# Patient Record
Sex: MALE | Race: WHITE | Employment: FULL TIME | URBAN - METROPOLITAN AREA
[De-identification: names, ages, dates, MRNs, and addresses within clinical notes are randomized per-mention and may not be internally consistent; named-entity substitution may affect disease eponyms.]

---

## 2018-08-22 PROBLEM — E78.2 MIXED HYPERLIPIDEMIA: Chronic | Status: ACTIVE | Noted: 2018-08-22

## 2018-08-25 ENCOUNTER — HOSPITAL ENCOUNTER (EMERGENCY)
Age: 28
Discharge: HOME | End: 2018-08-25
Attending: EMERGENCY MEDICINE
Payer: OTHER GOVERNMENT

## 2018-08-25 VITALS
TEMPERATURE: 97.6 F | HEART RATE: 59 BPM | OXYGEN SATURATION: 100 % | SYSTOLIC BLOOD PRESSURE: 134 MMHG | DIASTOLIC BLOOD PRESSURE: 85 MMHG | RESPIRATION RATE: 16 BRPM

## 2018-08-25 DIAGNOSIS — T15.02XA FOREIGN BODY OF LEFT CORNEA, INITIAL ENCOUNTER: Primary | ICD-10-CM

## 2018-08-25 PROCEDURE — 75810000285 HC RMVL FB EYE W/ OR W/O SLIT LAMP: Performed by: EMERGENCY MEDICINE

## 2018-08-25 PROCEDURE — 74011250637 HC RX REV CODE- 250/637: Performed by: EMERGENCY MEDICINE

## 2018-08-25 PROCEDURE — 99284 EMERGENCY DEPT VISIT MOD MDM: CPT | Performed by: EMERGENCY MEDICINE

## 2018-08-25 RX ORDER — ERYTHROMYCIN 5 MG/G
OINTMENT OPHTHALMIC
Status: COMPLETED | OUTPATIENT
Start: 2018-08-25 | End: 2018-08-25

## 2018-08-25 RX ORDER — ERYTHROMYCIN 5 MG/G
OINTMENT OPHTHALMIC
Qty: 1 TUBE | Refills: 0 | Status: SHIPPED | OUTPATIENT
Start: 2018-08-25 | End: 2018-10-09

## 2018-08-25 RX ADMIN — ERYTHROMYCIN: 5 OINTMENT OPHTHALMIC at 11:21

## 2018-08-25 NOTE — ED NOTES
I have reviewed discharge instructions with the patient. The patient verbalized understanding. Patient left ED via Discharge Method: ambulatory to Home with self  Opportunity for questions and clarification provided. Patient given 1 scripts. To continue your aftercare when you leave the hospital, you may receive an automated call from our care team to check in on how you are doing. This is a free service and part of our promise to provide the best care and service to meet your aftercare needs.  If you have questions, or wish to unsubscribe from this service please call [phone]. Thank you for Choosing our [facility].

## 2018-08-25 NOTE — ED PROVIDER NOTES
HPI Comments: [facility] when he felt like something got in his eye. Irritation since then. Worse at night. Some redness and increased watering. Mild photophobia. Mild blurred vision. Patient is a 29 y.o. male presenting with eye pain. The history is provided by the patient. No  was used. Eye Pain    This is a new problem. The current episode started yesterday. The problem occurs constantly. The problem has not changed since onset. The left eye is affected. The injury mechanism was a foreign body. The pain is mild. There is no history of trauma to the eye. There is no known exposure to pink eye. He does not wear contacts. Associated symptoms include blurred vision, foreign body sensation, photophobia, eye redness, itching and pain. Pertinent negatives include no numbness, no discharge, no nausea, no vomiting, no weakness and no fever. He has tried eye drops for the symptoms. The treatment provided no relief. History reviewed. No pertinent past medical history. Past Surgical History:   Procedure Laterality Date    HX ACL RECONSTRUCTION      HX APPENDECTOMY      HX CHOLECYSTECTOMY      HX FRACTURE TX      RECONSTR NOSE           Family History:   Problem Relation Age of Onset    No Known Problems Mother     Alcohol abuse Father     No Known Problems Maternal Grandmother     No Known Problems Maternal Grandfather     No Known Problems Paternal Grandmother     No Known Problems Paternal Grandfather        Social History     Social History    Marital status:      Spouse name: N/A    Number of children: N/A    Years of education: N/A     Occupational History    Not on file.      Social History Main Topics    Smoking status: Never Smoker    Smokeless tobacco: Current User    Alcohol use Yes      Comment: occ    Drug use: Not on file    Sexual activity: Not Currently     Other Topics Concern    Not on file     Social History Narrative         ALLERGIES: Oxycodone-acetaminophen; Haloperidol lactate; and Metoclopramide hcl    Review of Systems   Constitutional: Negative for chills and fever. Eyes: Positive for blurred vision, photophobia, pain, redness and visual disturbance. Negative for discharge. Respiratory: Negative for chest tightness, shortness of breath and wheezing. Cardiovascular: Negative for chest pain and leg swelling. Gastrointestinal: Negative for abdominal pain, diarrhea, nausea and vomiting. Musculoskeletal: Negative for back pain, gait problem, neck pain and neck stiffness. Skin: Positive for itching. Negative for color change and rash. Neurological: Negative for weakness, numbness and headaches. Vitals:    08/25/18 0656   BP: 140/90   Pulse: 69   Resp: 18   Temp: 97.6 °F (36.4 °C)   SpO2: 99%            Physical Exam   Constitutional: He is oriented to person, place, and time. He appears well-developed and well-nourished. HENT:   Head: Normocephalic and atraumatic. Eyes: EOM are normal. Pupils are equal, round, and reactive to light. Foreign body present in the left eye. Left conjunctiva is injected (mild). Slit lamp exam:       The left eye shows foreign body and fluorescein uptake. Pressure 16 in left eye. Cardiovascular: Normal rate and regular rhythm. No murmur heard. Pulmonary/Chest: Effort normal and breath sounds normal. He has no wheezes. Abdominal: Soft. Bowel sounds are normal. There is no tenderness. Musculoskeletal: Normal range of motion. He exhibits no edema. Neurological: He is alert and oriented to person, place, and time. Skin: Skin is warm and dry. Nursing note and vitals reviewed. MDM  Number of Diagnoses or Management Options  Diagnosis management comments: Corneal foreign body in left eye. Dr. Patrick Arevalo to see. Dr. Patrick Arevalo had success in removing the corneal foreign body.   We will refer to the ophthalmologist for follow-up on Monday and start patient on erythromycin ointment.        Amount and/or Complexity of Data Reviewed  Tests in the medicine section of CPT®: reviewed    Patient Progress  Patient progress: stable        ED Course       Procedures

## 2018-08-25 NOTE — ED TRIAGE NOTES
Pt reports watery red left eye since yesterday, concerned he may have gotten something in it. Pt reports blurry vision in that eye.  Ambulatory to triage

## 2018-08-25 NOTE — CONSULTS
S.  28 yo male with corneal foreign body x 1 day OS. Called by ER to help with removal of FB. Patient is a  and noted FBS yesterday morning. O.  Visual acuity performed by ER physician       IOP normal to tactile OU       EOM's unrestricted        Anterior segment OS        Conj:  1+ injection        Corneal metallic FB with rust ring below visual axis at 5:30       Anterior chamber clear quiet    A.   Metallic corneal FB OS    P.  FB removed at slit lamp with 27 g needle and Algerbrush       Maxitrol jalen TID       F/U Monday in clinic at 9:00 am       [address] [place] [address] Drive, [address]        [place], [address] [facility]

## 2018-08-25 NOTE — DISCHARGE INSTRUCTIONS
Object in the Eye: Care Instructions  Your Care Instructions  It is common for a speck of dirt or a small object, such as an eyelash or an insect, to get in the eye. Usually your tears wash the object out. But the speck can scratch the surface of the eye (cornea). If the eye surface is scratched, it can feel as if something is still in the eye. Most surface scratches are minor and heal on their own in a day or two. If the object was still in your eye, your doctor probably removed it during your exam. Sometimes these objects become stuck deep in the eye and require more treatment. For instance, a metal object may leave a rust ring. Follow-up care is a key part of your treatment and safety. Be sure to make and go to all appointments, and call your doctor if you are having problems. It's also a good idea to know your test results and keep a list of the medicines you take. How can you care for yourself at home? · The doctor probably used medicine to numb your eye. When it wears off in 30 to 60 minutes, your eye pain may come back. Take over-the-counter pain medicine, such as acetaminophen (Tylenol), ibuprofen (Advil, Motrin), or naproxen (Aleve), as needed. Read and follow all instructions on the label. · Do not take two or more pain medicines at the same time unless the doctor told you to. Many pain medicines have acetaminophen, which is Tylenol. Too much acetaminophen (Tylenol) can be harmful. · If your doctor prescribed antibiotics, take them as directed. Do not stop taking them just because you feel better. You need to take the full course of antibiotics. · The doctor may have put a patch over your injured eye. If so, keep your eye closed under the patch. This will make your eye feel better. Do not remove the patch until your doctor has told you to. · If you do not have a patch, keep your hurt eye closed to reduce pain. · Wash your hands before touching your eye. · Do not rub your injured eye.  Rubbing can make it worse. · Use the prescribed eyedrops or ointment as directed. Be sure the dropper or bottle tip is clean. · To put in eyedrops or ointment:  ¨ Tilt your head back, and pull your lower eyelid down with one finger. ¨ Drop or squirt the medicine inside the lower lid. ¨ Close your eye for 30 to 60 seconds to let the drops or ointment move around. ¨ Do not touch the ointment or dropper tip to your eyelashes or any other surface. · Do not use a contact lens in your hurt eye until your doctor says you can. Also, do not wear eye makeup until your eye heals. · Do not drive if you are wearing an eye patch. You cannot  distances well. · For the first 24 to 48 hours, limit reading and other tasks that require a lot of eye movement. · Bright light may hurt. It may help to wear dark glasses. · To prevent eye injuries in the future, wear safety glasses or goggles when you work with machines or tools, mow the lawn, or ride a bike or motorcycle. When should you call for help? Call 911 anytime you think you may need emergency care. For example, call if:    · You suddenly cannot see or can barely see.    Call your doctor now or seek immediate medical care if:    · You have signs of infection in the eye, such as:  ¨ Pus or thick discharge coming from the eye. ¨ Redness or swelling around the eye. ¨ A fever.     · You have new or increasing eye pain.     · It feels like sand is in your eye when you blink.    Watch closely for changes in your health, and be sure to contact your doctor if:    · You are not getting better after 1 day (24 hours). Where can you learn more? Go to http://barbara-sabine.info/. Enter W922 in the search box to learn more about \"Object in the Eye: Care Instructions. \"  Current as of: November 20, 2017  Content Version: 11.7  © 5763-2521 "[employer]".  Care instructions adapted under license by [employer] (which disclaims liability or warranty for this information). If you have questions about a medical condition or this instruction, always ask your healthcare professional. [place] [address] any warranty or liability for your use of this information.

## 2020-08-18 PROBLEM — F43.23 ADJUSTMENT REACTION WITH ANXIETY AND DEPRESSION: Status: ACTIVE | Noted: 2020-08-18
